# Patient Record
Sex: MALE | Race: WHITE
[De-identification: names, ages, dates, MRNs, and addresses within clinical notes are randomized per-mention and may not be internally consistent; named-entity substitution may affect disease eponyms.]

---

## 2018-12-31 ENCOUNTER — HOSPITAL ENCOUNTER (EMERGENCY)
Dept: HOSPITAL 25 - UCCORT | Age: 63
Discharge: HOME | End: 2018-12-31
Payer: COMMERCIAL

## 2018-12-31 VITALS — DIASTOLIC BLOOD PRESSURE: 76 MMHG | SYSTOLIC BLOOD PRESSURE: 117 MMHG

## 2018-12-31 DIAGNOSIS — J09.X2: Primary | ICD-10-CM

## 2018-12-31 PROCEDURE — G0463 HOSPITAL OUTPT CLINIC VISIT: HCPCS

## 2018-12-31 PROCEDURE — 99202 OFFICE O/P NEW SF 15 MIN: CPT

## 2018-12-31 NOTE — UC
FLU HPI





- HPI Summary


HPI Summary: 





Patient states starting on Christmas Bhakti he had head congestion and body aches 

and right or chills.  Patient states he sat process fireplace with a slight 

chronic could not get warm.  Patient states since this time he has a persistent 

cough with some clear sputum.  Patient states he has a little bit of sinus 

pressure.  Patient states overall he feels like is getting better but concerned 

about the cough becomes effective sleep. + myalgias  no documented fever Patient

's wife also ill that she is improved.  Patient to get the flu vaccine this 

year.  Patient is not immunocompromised.  Patient denies nausea vomiting.  

Patient's been drinking plenty of fluids but decreased appetite.  No difficulty 

with urination or changes to his bowels.  Patient's medications reviewed this 

visit.





- History of Current Complaint


Chief Complaint: UCGeneralIllness


Stated Complaint: COUGH, BODY ACHES, CHILLS


Time Seen by Provider: 12/31/18 10:37


Hx Obtained From: Patient


Onset/Duration: Gradual Onset


Severity Currently: Mild


Severity Initially: Mild


Pain Intensity: 2


Pain Scale Used: 0-10 Numeric





- Allergy/Home Medications


Allergies/Adverse Reactions: 


 Allergies











Allergy/AdvReac Type Severity Reaction Status Date / Time


 


No Known Allergies Allergy   Verified 12/31/18 10:01











Home Medications: 


 Home Medications





Atorvastatin* [Lipitor*] 20 mg PO DAILY 12/31/18 [History Confirmed 12/31/18]











PMH/Surg Hx/FS Hx/Imm Hx


Previously Healthy: Yes





- Surgical History


Surgical History: None





- Family History


Known Family History: Positive: Non-Contributory





- Social History


Lives: With Family


Alcohol Use: Rare


Substance Use Type: None


Smoking Status (MU): Never Smoked Tobacco





Review of Systems


All Other Systems Reviewed And Are Negative: Yes


Constitutional: Positive: Chills, Fatigue


ENT: Positive: Nasal Discharge, Sinus Congestion, Sinus Pain/Tenderness


Respiratory: Positive: Cough





Physical Exam





- Summary


Physical Exam Summary: 





Vital Signs Reviewed: Yes


A+Ox3, no distress, tired appearing


Eyes: Conjunctiva Clear, MOHINI. EOM intact and full


ENT: Hearing grossly normal  TM x 2 clear, turbinates inflammed, boggy + PND, 

mmoist, uvula midline, no exudate, no erythema


Neck: Positive: Supple


Respiratory: Positive: No respiratory distress, No accessory muscle use + CTA 

throughout  no w/r


Cardiovascular: RRR  nl s1, s2  no m/r  CBT <2  sec


abd soft + BS nt/nd  no guarding, no distension


Musculoskeletal Exam: KINSEY x 4 without difficulty Strength Intact, ROM Intact


Neurological: Positive: Alert,  + sensation throughout


Psychological: Positive: Normal Response To Family


Skin: Positive: no rash, no ecchymosis


Triage Information Reviewed: Yes


Vital Signs: 


 Initial Vital Signs











Temp  98 F   12/31/18 09:58


 


Pulse  57   12/31/18 09:58


 


Resp  14   12/31/18 09:58


 


BP  117/76   12/31/18 09:58


 


Pulse Ox  99   12/31/18 09:58














Flu Course/Dx





- Course


Course Of Treatment: Patient presents to urgent care with 6 days of progressive 

congestion cough chills and myalgias.  Patient states she's feeling better but 

still having a cough is keeping up and night.  Patient has taken over-the-

counter cough and cold medication with little improvement.  On exam vital signs 

are stable.  Patient appears tired but otherwise non-concerning exam.  Patient 

does have some bogginess to his turbinates.  Patient is influenza A positive.  

Reviewed with patient out of the window for Tamiflu.  Reviewed secretion 

precaution.  Motrin/Tylenol.  Continue with hydration.  Recommend patient  

continue with humidified air as he has a wood-burning stove.  Strict return 

precautions.  Patient comfortable in agreement with plan.  Patient declined any 

notes.  We'll give patient Robitussin with codeine to use as needed.  Patient 

denies any opiate history.  I-STAT was checked.  Patient aware is an opiate 

narcotic in his house.





- Differential Dx/Diagnosis


Provider Diagnosis: 


 Influenza








Discharge





- Sign-Out/Discharge


Documenting (check all that apply): Patient Departure


All imaging exams completed and their final reports reviewed: No Studies





- Discharge Plan


Condition: Stable


Disposition: HOME


Prescriptions: 


Fluticasone NASAL SPRAY 50MCG* [Flonase NASAL SPRAY 50MCG*] 2 spray BOTH NARES 

DAILY #1 btl


guaiFENesin/CODIEN 100MG-10MG* [Robitussin AC 100Mg-10Mg*] 10 ml PO TID #100 ml 

MDD 30


Patient Education Materials:  Influenza (ED)


Referrals: 


Lucretia Blanton MD [Primary Care Provider] - 


Additional Instructions: 


- Stay well hydrated. Drink plenty of non-alcoholic, non-caffinated beverages.


- Okay to take tylenol every 6 hours in addition to your Aleve.  Take with 

food. Do NOT take for more than 4-5 days. 


- These infections are spread by secretions - do NOT share eating or drinking 

utensils - clean items you share with other people such as cell phones, 

computer mouse, TV remote, computer tablets,etc. Once you start to feel better, 

change your toothbrush and your pillowcase.


- get plenty of restful sleep


- humidify the air in the room where you sleep - boil water, run a hot steam 

shower, vaporizer, cups of water by heat register


- okay to take over the counter decongestant and cough medication - consider 

Claritin-d, allegra-D, Zyrtec-D


- use nasal spray as instructed


- you have been given a prescription for robitussin with codeine  - this is a 

narcotic - do NOT drive, operate machinery or drink alcohol while taking this 

medication


- contact your doctor or return with questions or concerns





- Billing Disposition and Condition


Condition: STABLE


Disposition: Home